# Patient Record
Sex: FEMALE | Race: WHITE | URBAN - NONMETROPOLITAN AREA
[De-identification: names, ages, dates, MRNs, and addresses within clinical notes are randomized per-mention and may not be internally consistent; named-entity substitution may affect disease eponyms.]

---

## 2022-01-20 ENCOUNTER — OFFICE VISIT (OUTPATIENT)
Dept: URBAN - NONMETROPOLITAN AREA CLINIC 4 | Facility: CLINIC | Age: 67
End: 2022-01-20

## 2022-02-01 ENCOUNTER — WEB ENCOUNTER (OUTPATIENT)
Dept: URBAN - NONMETROPOLITAN AREA CLINIC 4 | Facility: CLINIC | Age: 67
End: 2022-02-01

## 2022-02-01 ENCOUNTER — DASHBOARD ENCOUNTERS (OUTPATIENT)
Age: 67
End: 2022-02-01

## 2022-02-01 ENCOUNTER — OFFICE VISIT (OUTPATIENT)
Dept: URBAN - NONMETROPOLITAN AREA CLINIC 4 | Facility: CLINIC | Age: 67
End: 2022-02-01
Payer: COMMERCIAL

## 2022-02-01 VITALS
HEIGHT: 64 IN | WEIGHT: 178 LBS | BODY MASS INDEX: 30.39 KG/M2 | TEMPERATURE: 97.5 F | HEART RATE: 68 BPM | DIASTOLIC BLOOD PRESSURE: 66 MMHG | SYSTOLIC BLOOD PRESSURE: 145 MMHG

## 2022-02-01 DIAGNOSIS — Z87.19 PERSONAL HISTORY OF OTHER DISEASES OF THE DIGESTIVE SYSTEM: ICD-10-CM

## 2022-02-01 DIAGNOSIS — Z98.890 OTHER SPECIFIED POSTPROCEDURAL STATES: ICD-10-CM

## 2022-02-01 DIAGNOSIS — I63.9 CEREBROVASCULAR ACCIDENT (CVA), UNSPECIFIED MECHANISM: ICD-10-CM

## 2022-02-01 DIAGNOSIS — K57.30 COLON, DIVERTICULOSIS: ICD-10-CM

## 2022-02-01 DIAGNOSIS — R10.84 GENERALIZED ABDOMINAL PAIN: ICD-10-CM

## 2022-02-01 PROBLEM — 397881000: Status: ACTIVE | Noted: 2022-02-01

## 2022-02-01 PROBLEM — 230690007: Status: ACTIVE | Noted: 2022-02-01

## 2022-02-01 PROCEDURE — 99204 OFFICE O/P NEW MOD 45 MIN: CPT | Performed by: REGISTERED NURSE

## 2022-02-01 RX ORDER — FLUCONAZOLE 100 MG/1
TAKE 1 TABLET (100 MG) BY ORAL ROUTE ONCE DAILY FOR 14 DAYS TABLET ORAL 1
Qty: 14 | Refills: 0 | Status: ACTIVE | COMMUNITY

## 2022-02-01 RX ORDER — INSULIN GLARGINE 100 [IU]/ML
INJECTION, SOLUTION SUBCUTANEOUS
Qty: 0 | Refills: 0 | Status: ACTIVE | COMMUNITY

## 2022-02-01 NOTE — HPI-TODAY'S VISIT:
Jan. 4, 2018 Patient is 62 year old female with abdominal pain. Whitecount of 19,000 in hosptial 2 months ago, hemoglobin at 8. CT scan in October indicated small bowel obstruction. She reports she had a tube inserted and did not have a colonoscopy due to how inflamed her colon was. Patient reports occasional vomiting episodes. She report she has felt this was since September 5, 2017. Stools vary, sometimes she has dark tarry stools very inconsistent.  Currently Jan. 25, 2018 1/17/18 CT distended small bowel loop in abdomen. 1/18/18 X-ray distended small bowel in abdomen. Markers concerning or inflammatory bowel disease for Crohn's. Discussed referral to surgeon for small bowel distension. Patient reports she is in severe pain and is experiencing some nausea. She has lost 10 pounds since last visit.  2/1/22: Pt RTC for f/u. Last seen in 2018. 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 She is currently on Plavix and ASA. She underwent robot assisted incisional hernia repair with mesh 1/8/20. Since then, she reports occasional abdominal pain near incision. Denies N/V, heartburn, reflux, changes in bowel habits, hematochezia or melena. No FHx of CRC or polpys.   Last cscope 1/19/18 by Dr. Escobedo.  FINDINGS: 1-Normal colonic mucosa throughout without signs of colitis. 2-Poor rectal tone on rectal examination. 3-Diverticulosis, small and large, noted in transverse, descending and sigmoid colon.

## 2022-02-01 NOTE — PHYSICAL EXAM GASTROINTESTINAL
Abdomen , soft, mild TTP over well healed incision, nondistended , no guarding or rigidity , no masses palpable , normal bowel sounds , Liver and Spleen , no hepatomegaly present , no hepatosplenomegaly , liver nontender , spleen not palpable

## 2023-02-01 ENCOUNTER — OFFICE VISIT (OUTPATIENT)
Dept: URBAN - NONMETROPOLITAN AREA CLINIC 4 | Facility: CLINIC | Age: 68
End: 2023-02-01